# Patient Record
Sex: FEMALE | Race: WHITE | NOT HISPANIC OR LATINO | Employment: FULL TIME | URBAN - METROPOLITAN AREA
[De-identification: names, ages, dates, MRNs, and addresses within clinical notes are randomized per-mention and may not be internally consistent; named-entity substitution may affect disease eponyms.]

---
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
## 2019-01-02 PROBLEM — N93.8 DUB (DYSFUNCTIONAL UTERINE BLEEDING): Chronic | Status: ACTIVE | Noted: 2019-01-02

## 2019-01-02 PROBLEM — R10.30 LOWER ABDOMINAL PAIN: Status: ACTIVE | Noted: 2019-01-02

## 2019-01-02 PROBLEM — N80.30 ENDOMETRIOSIS OF PELVIC PERITONEUM: Chronic | Status: ACTIVE | Noted: 2019-01-02

## 2019-01-02 PROBLEM — R10.2 PELVIC PAIN IN FEMALE: Chronic | Status: ACTIVE | Noted: 2019-01-02

## 2020-04-19 PROBLEM — Z3A.39 39 WEEKS GESTATION OF PREGNANCY: Status: ACTIVE | Noted: 2020-04-19

## 2020-04-20 PROBLEM — O99.213 OBESITY AFFECTING PREGNANCY IN THIRD TRIMESTER, ANTEPARTUM: Chronic | Status: ACTIVE | Noted: 2020-04-20

## 2020-04-22 PROBLEM — Z3A.39 39 WEEKS GESTATION OF PREGNANCY: Status: RESOLVED | Noted: 2020-04-19 | Resolved: 2020-04-22

## 2020-04-22 PROBLEM — N80.30 ENDOMETRIOSIS OF PELVIC PERITONEUM: Chronic | Status: RESOLVED | Noted: 2019-01-02 | Resolved: 2020-04-22

## 2020-04-22 PROBLEM — R10.2 PELVIC PAIN IN FEMALE: Chronic | Status: RESOLVED | Noted: 2019-01-02 | Resolved: 2020-04-22

## 2020-04-22 PROBLEM — R10.2 SUPRAPUBIC PAIN: Status: RESOLVED | Noted: 2018-12-06 | Resolved: 2020-04-22

## 2020-04-22 PROBLEM — R10.30 LOWER ABDOMINAL PAIN: Status: RESOLVED | Noted: 2019-01-02 | Resolved: 2020-04-22

## 2020-04-22 PROBLEM — N93.8 DUB (DYSFUNCTIONAL UTERINE BLEEDING): Chronic | Status: RESOLVED | Noted: 2019-01-02 | Resolved: 2020-04-22

## 2020-04-22 PROBLEM — O99.213 OBESITY AFFECTING PREGNANCY IN THIRD TRIMESTER, ANTEPARTUM: Chronic | Status: RESOLVED | Noted: 2020-04-20 | Resolved: 2020-04-22

## 2020-04-22 PROBLEM — R35.1 NOCTURIA: Status: RESOLVED | Noted: 2018-12-06 | Resolved: 2020-04-22

## 2020-04-22 PROBLEM — R31.29 MICROSCOPIC HEMATURIA: Status: RESOLVED | Noted: 2018-12-06 | Resolved: 2020-04-22

## 2021-05-10 ENCOUNTER — PATIENT MESSAGE (OUTPATIENT)
Dept: RESEARCH | Facility: HOSPITAL | Age: 21
End: 2021-05-10

## 2024-01-22 ENCOUNTER — OFFICE VISIT (OUTPATIENT)
Dept: URGENT CARE | Facility: CLINIC | Age: 24
End: 2024-01-22
Payer: COMMERCIAL

## 2024-01-22 VITALS
OXYGEN SATURATION: 100 % | BODY MASS INDEX: 22.9 KG/M2 | WEIGHT: 160 LBS | SYSTOLIC BLOOD PRESSURE: 104 MMHG | RESPIRATION RATE: 18 BRPM | HEART RATE: 101 BPM | HEIGHT: 70 IN | TEMPERATURE: 99 F | DIASTOLIC BLOOD PRESSURE: 73 MMHG

## 2024-01-22 DIAGNOSIS — H66.001 NON-RECURRENT ACUTE SUPPURATIVE OTITIS MEDIA OF RIGHT EAR WITHOUT SPONTANEOUS RUPTURE OF TYMPANIC MEMBRANE: Primary | ICD-10-CM

## 2024-01-22 DIAGNOSIS — R05.9 COUGH, UNSPECIFIED TYPE: ICD-10-CM

## 2024-01-22 DIAGNOSIS — U07.1 COVID-19: ICD-10-CM

## 2024-01-22 LAB
CTP QC/QA: YES
CTP QC/QA: YES
POC MOLECULAR INFLUENZA A AGN: NEGATIVE
POC MOLECULAR INFLUENZA B AGN: NEGATIVE
SARS-COV-2 AG RESP QL IA.RAPID: POSITIVE

## 2024-01-22 PROCEDURE — 87811 SARS-COV-2 COVID19 W/OPTIC: CPT | Mod: QW,S$GLB,, | Performed by: PHYSICIAN ASSISTANT

## 2024-01-22 PROCEDURE — 87502 INFLUENZA DNA AMP PROBE: CPT | Mod: QW,S$GLB,, | Performed by: PHYSICIAN ASSISTANT

## 2024-01-22 PROCEDURE — 99203 OFFICE O/P NEW LOW 30 MIN: CPT | Mod: S$GLB,,, | Performed by: PHYSICIAN ASSISTANT

## 2024-01-22 RX ORDER — IPRATROPIUM BROMIDE 21 UG/1
2 SPRAY, METERED NASAL 2 TIMES DAILY
Qty: 30 ML | Refills: 0 | Status: SHIPPED | OUTPATIENT
Start: 2024-01-22

## 2024-01-22 RX ORDER — DEXTROMETHORPHAN HYDROBROMIDE, GUAIFENESIN, PHENYLEPHRINE HYDROCHLORIDE 17.5; 400; 1 MG/1; MG/1; MG/1
1 TABLET ORAL
Qty: 20 TABLET | Refills: 0 | Status: SHIPPED | OUTPATIENT
Start: 2024-01-22

## 2024-01-22 RX ORDER — LISDEXAMFETAMINE DIMESYLATE 40 MG/1
40 CAPSULE ORAL
COMMUNITY
Start: 2024-01-05

## 2024-01-22 RX ORDER — PROMETHAZINE HYDROCHLORIDE AND DEXTROMETHORPHAN HYDROBROMIDE 6.25; 15 MG/5ML; MG/5ML
5 SYRUP ORAL EVERY 4 HOURS PRN
Qty: 120 ML | Refills: 0 | Status: SHIPPED | OUTPATIENT
Start: 2024-01-22 | End: 2024-02-01

## 2024-01-22 RX ORDER — AMOXICILLIN AND CLAVULANATE POTASSIUM 875; 125 MG/1; MG/1
1 TABLET, FILM COATED ORAL EVERY 12 HOURS
Qty: 20 TABLET | Refills: 0 | Status: SHIPPED | OUTPATIENT
Start: 2024-01-22 | End: 2024-02-01

## 2024-01-22 NOTE — LETTER
January 22, 2024      [facility]  [address], [address]  [place] [address]  Phone: [phone]  Fax: [phone]       Patient: Cheri Ha   YOB: 2000  Date of Visit: 01/22/2024    To Whom It May Concern:    Haley Ha  was at [facility] on 01/22/2024. The patient may return to work/school on 01/25/2024 as long as she is fever free and symptoms improve with no restrictions. If you have any questions or concerns, or if I can be of further assistance, please do not hesitate to contact me.    Sincerely,    Jillian Perales PA-C

## 2024-01-22 NOTE — PROGRESS NOTES
"Subjective:      Patient ID: Cheri Ha is a 23 y.o. female.    Vitals:  height is 5' 10" (1.778 m) and weight is 72.6 kg (160 lb). Her tympanic temperature is 98.8 °F (37.1 °C). Her blood pressure is 104/73 and her pulse is 101. Her respiration is 18 and oxygen saturation is 100%.     Chief Complaint: Sinus Problem    Sinus Problem  This is a new problem. Episode onset: 4 days. The problem has been gradually worsening since onset. There has been no fever. Her pain is at a severity of 7/10. The pain is moderate. Associated symptoms include congestion, coughing, ear pain, headaches, sinus pressure and a sore throat. (Sweats   ) Treatments tried: dayquil. The treatment provided no relief.       HENT:  Positive for ear pain, congestion, sinus pressure and sore throat.    Respiratory:  Positive for cough.    Neurological:  Positive for headaches.      Objective:     Physical Exam   Constitutional: She is oriented to person, place, and time. She appears well-developed. She is cooperative.  Non-toxic appearance. She does not appear ill. No distress.   HENT:   Head: Normocephalic and atraumatic.   Ears:   Right Ear: Hearing, external ear and ear canal normal. Tympanic membrane is injected, erythematous and retracted. A middle ear effusion is present. impacted cerumen  Left Ear: Hearing, external ear and ear canal normal. Tympanic membrane is injected. Tympanic membrane is not erythematous, not retracted and not bulging. A middle ear effusion is present. impacted cerumen  Nose: Mucosal edema, rhinorrhea and congestion present.   Mouth/Throat: Mucous membranes are moist. No oropharyngeal exudate or posterior oropharyngeal erythema. Oropharynx is clear.   Eyes: Conjunctivae and lids are normal. No scleral icterus.   Neck: Phonation normal. Neck supple.   Cardiovascular: Normal rate, regular rhythm and normal heart sounds.   No murmur heard.Exam reveals no gallop and no friction rub.   Pulmonary/Chest: Effort normal and " breath sounds normal. No stridor. No respiratory distress. She has no decreased breath sounds. She has no wheezes. She has no rhonchi. She has no rales.   Abdominal: Normal appearance.   Neurological: She is alert and oriented to person, place, and time. Coordination normal.   Skin: Skin is intact, not diaphoretic and not pale.   Psychiatric: Her speech is normal and behavior is normal. Judgment and thought content normal.   Nursing note and vitals reviewed.      Assessment:     1. Non-recurrent acute suppurative otitis media of right ear without spontaneous rupture of tympanic membrane    2. Cough, unspecified type    3. COVID-19        Plan:       Non-recurrent acute suppurative otitis media of right ear without spontaneous rupture of tympanic membrane  -     amoxicillin-clavulanate 875-125mg (AUGMENTIN) 875-125 mg per tablet; Take 1 tablet by mouth every 12 (twelve) hours. for 10 days  Dispense: 20 tablet; Refill: 0    Cough, unspecified type  -     SARS Coronavirus 2 Antigen, POCT Manual Read  -     POCT Influenza A/B MOLECULAR  -     promethazine-dextromethorphan (PROMETHAZINE-DM) 6.25-15 mg/5 mL Syrp; Take 5 mLs by mouth every 4 (four) hours as needed (cough).  Dispense: 120 mL; Refill: 0    COVID-19  -     ipratropium (ATROVENT) 21 mcg (0.03 %) nasal spray; 2 sprays by Each Nostril route 2 (two) times daily.  Dispense: 30 mL; Refill: 0  -     phenylephrine-DM-guaiFENesin (DECONEX DMX) 10-17.5-400 mg Tab; Take 1 tablet by mouth every 4 to 6 hours as needed (congestion/cough).  Dispense: 20 tablet; Refill: 0  -     promethazine-dextromethorphan (PROMETHAZINE-DM) 6.25-15 mg/5 mL Syrp; Take 5 mLs by mouth every 4 (four) hours as needed (cough).  Dispense: 120 mL; Refill: 0      Results for orders placed or performed in visit on 01/22/24   SARS Coronavirus 2 Antigen, POCT Manual Read   Result Value Ref Range    SARS Coronavirus 2 Antigen Positive (A) Negative     Acceptable Yes    POCT Influenza A/B  MOLECULAR   Result Value Ref Range    POC Molecular Influenza A Ag Negative Negative, Not Reported    POC Molecular Influenza B Ag Negative Negative, Not Reported     Acceptable Yes      Alternate ibuprofen and tylenol as needed for fever/pain/body aches every 4-6 hours. Rest, increase PO fluids.   Discussed with patient the importance of f/u with their primary care provider. Urged to go to the ER for any worsening signs or symptoms.